# Patient Record
Sex: FEMALE | Race: WHITE
[De-identification: names, ages, dates, MRNs, and addresses within clinical notes are randomized per-mention and may not be internally consistent; named-entity substitution may affect disease eponyms.]

---

## 2018-07-30 ENCOUNTER — HOSPITAL ENCOUNTER (OUTPATIENT)
Dept: HOSPITAL 80 - BRMIMAGING | Age: 68
End: 2018-07-30
Attending: INTERNAL MEDICINE
Payer: COMMERCIAL

## 2018-07-30 DIAGNOSIS — R18.8: ICD-10-CM

## 2018-07-30 DIAGNOSIS — K74.69: Primary | ICD-10-CM

## 2018-09-06 ENCOUNTER — HOSPITAL ENCOUNTER (OUTPATIENT)
Dept: HOSPITAL 80 - BRMIMAGING | Age: 68
End: 2018-09-06
Attending: FAMILY MEDICINE
Payer: COMMERCIAL

## 2018-09-06 DIAGNOSIS — N64.89: Primary | ICD-10-CM

## 2019-01-23 ENCOUNTER — HOSPITAL ENCOUNTER (OUTPATIENT)
Dept: HOSPITAL 80 - BRMIMAGING | Age: 69
End: 2019-01-23
Attending: INTERNAL MEDICINE
Payer: COMMERCIAL

## 2019-01-23 DIAGNOSIS — R18.8: ICD-10-CM

## 2019-01-23 DIAGNOSIS — K74.69: Primary | ICD-10-CM

## 2019-01-23 DIAGNOSIS — K83.8: ICD-10-CM

## 2019-02-07 ENCOUNTER — HOSPITAL ENCOUNTER (OUTPATIENT)
Dept: HOSPITAL 80 - CIMAGING | Age: 69
End: 2019-02-07
Attending: FAMILY MEDICINE
Payer: COMMERCIAL

## 2019-02-07 DIAGNOSIS — I82.4Z2: ICD-10-CM

## 2019-02-07 DIAGNOSIS — I82.4Y2: Primary | ICD-10-CM

## 2019-02-21 ENCOUNTER — HOSPITAL ENCOUNTER (INPATIENT)
Dept: HOSPITAL 80 - CED | Age: 69
LOS: 3 days | Discharge: HOME | DRG: 253 | End: 2019-02-24
Attending: HOSPITALIST | Admitting: HOSPITALIST
Payer: COMMERCIAL

## 2019-02-21 DIAGNOSIS — E03.9: ICD-10-CM

## 2019-02-21 DIAGNOSIS — D69.6: ICD-10-CM

## 2019-02-21 DIAGNOSIS — M85.88: ICD-10-CM

## 2019-02-21 DIAGNOSIS — K75.4: ICD-10-CM

## 2019-02-21 DIAGNOSIS — N18.9: ICD-10-CM

## 2019-02-21 DIAGNOSIS — N17.9: ICD-10-CM

## 2019-02-21 DIAGNOSIS — I82.512: Primary | ICD-10-CM

## 2019-02-21 DIAGNOSIS — Z85.528: ICD-10-CM

## 2019-02-21 DIAGNOSIS — I82.422: ICD-10-CM

## 2019-02-21 LAB
INR PPP: 2.24 (ref 0.83–1.16)
PLATELET # BLD: 133 10^3/UL (ref 150–400)
PROTHROMBIN TIME: 24.8 SEC (ref 12–15)

## 2019-02-21 PROCEDURE — C1876 STENT, NON-COA/NON-COV W/DEL: HCPCS

## 2019-02-21 PROCEDURE — C1769 GUIDE WIRE: HCPCS

## 2019-02-21 PROCEDURE — C1757 CATH, THROMBECTOMY/EMBOLECT: HCPCS

## 2019-02-21 PROCEDURE — C1892 INTRO/SHEATH,FIXED,PEEL-AWAY: HCPCS

## 2019-02-21 PROCEDURE — C1725 CATH, TRANSLUMIN NON-LASER: HCPCS

## 2019-02-21 PROCEDURE — C1894 INTRO/SHEATH, NON-LASER: HCPCS

## 2019-02-21 NOTE — PDGENHP
History and Physical





- Chief Complaint


LLE pain and swelling





- History of Present Illness


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 

esophageal varices presents to ED with LLE swelling and pain.  She was 

diagnosed with LLE DVT 2 weeks ago and started on Coumadin.  She went for an 

INR check today and her INR is therapeutic at 2.6.  However, staff there were 

concerned about her swelling and reported ongoing pain and a f/u ultrasound was 

ordered.  This showed extensive clot burden, which was unchanged, but extended 

proximally with unclear end point, likely involving iliacs.  She was sent to 

the ED and from there directly admitted to the hospital for further management 

of her recurrent pain and swelling from her DVT.  She is able to ambulate.  She 

denies CP or SOB.  No fevers.  No change in her abdominal girth and no 

abdominal symptoms including N/V/D.  





History Information





- Allergies/Home Medication List


Allergies/Adverse Reactions: 








gluten [Gluten] Allergy (Severe, Verified 02/21/19 15:28)


 DIARRHEA SEVERE


No Allergies [NKDA] Allergy (Verified 02/21/19 15:28)


 





Home Medications: 








Furosemide [Lasix 40 MG (*)] 40 mg PO BIDDIUR 02/21/19 [Last Taken Unknown]


Insulin Glargine,Hum.rec.anlog [Toujeo Max Solostar] 0 unit SQ 02/21/19 [Last 

Taken Unknown]


Insulin Lispro [Humalog Santiago Kwikpen] 0 unit SQ 02/21/19 [Last Taken Unknown]


Levothyroxine [Synthroid 25 mcg (*)] 25 mcg PO DAILY06 02/21/19 [Last Taken 

Unknown]


Propranolol HCl [Inderal 10mg (*)] 10 mg PO BID 02/21/19 [Last Taken Unknown]


Sertraline HCl [Zoloft 50mg (*)] 50 mg PO DAILY 02/21/19 [Last Taken Unknown]


Spironolactone [Aldactone 50 MG (RX)] 200 mg PO DAILY 02/21/19 [Last Taken 

Unknown]


Warfarin Sodium  02/21/19 [Last Taken Unknown]





I have personally reviewed and updated: family history, medical history, social 

history, surgical history





- Past Medical History


Additional medical history: ESLD 2/2 autoimmune hepatitis - MELD 15.  Ascites.  

Esophageal varices.  RCC s/p b/l partial nephrectomy.  Hypothyroidism.  Celiac.

  Crohn's.  DVT





- Surgical History


Reports: cholecystectomy, hysterectomy, hernia repair


Additional surgical history: b/l partial nephrectomy





- Family History


Positive for: cancer





- Social History


Smoking Status: Never smoked


Alcohol Use: None


Drug Use: None


Additional social history: Lives independently, on disability due to ESLD





Review of Systems


Review of Systems: 





ROS: 10pt was reviewed & negative except for what was stated in HPI & below





Physical Exam


Physical Exam: 

















Temp Pulse Resp BP Pulse Ox


 


 36.5 C   50 L  18   121/59 H  100 


 


 02/21/19 20:00  02/21/19 20:00  02/21/19 20:00  02/21/19 20:00  02/21/19 20:00











Constitutional: no apparent distress


Eyes: PERRL


Ears, Nose, Mouth, Throat: moist mucous membranes


Cardiovascular: regular rate and rhythym


Respiratory: no respiratory distress, clear to auscultation


Gastrointestinal: normoactive bowel sounds, soft, non-tender abdomen


Skin: warm


Musculoskeletal: full muscle strength, other (LLE with edema and venous 

engorgement)


Neurologic: AAOx3


Psychiatric: interacting appropriately





Assessment & Plan


Assessment: 








LLE DVT - diagnosed 2 weeks ago, now therapeutic on Coumadin, INR 2.6  Rpt u/s 

with extensive clot burden, likely involving iliacs, with recurrent pain and 

swelling.  Note h/o recurrent swelling with prolonged seated position dating 

back >10 yrs, ?May Thurner syndrome.


   -CT abd/pelvis with venous contrast tonight for further evaluation of clot 

burden and r/o obstructive process


   -discussed case with Dr. Dinora Kennedy, IR to consult in am for consideration of 

catheter directed tPa


   -NPO at midnight


   -check cbc





ESLD with ascites 2/2 autoimmune hepatitis - MELD 15, not candidate for 

transplant program.  Compensated.


   -cont home lasix and spironolactone, awaiting med rec


   -check CMP, INR


   


H/O esophageal varices - no bleeding currently


   -monitor closely on anticoagulation





RCC s/p b/l partial nephrectomy





CKD - Baseline Cr ~1.3


   -send chem panel


   -gentle hydration after IV contrast, monitor





Hypothyroidism - cont home levothyroxine





Full code





Dispo - admit to inpt, anticipate >48 hrs hospitalization for management of 

extensive LLE DVT and probable catheter directed lysis

## 2019-02-21 NOTE — EDPHY
H & P


Stated Complaint: leg swelling


Time Seen by Provider: 02/21/19 15:02


HPI/ROS: 





69 yo F with hx of left leg dvt on warfarin, inr stable 2.6 presents with ankle 

swelling, she admits she did much more walking than ususal yesterday.


No chest pain, no shortness of breath, no fever or chills, denies ankle injury.





Review of systems


As per HPI


General no fever no chills no weakness


HEENT no eye pain no eye discharge. No eye redness, no sore throat


Respiratory no cough, no shortness of breath


Cardiac no chest pain, positive peripheral edema


GI no abdominal pain, no diarrhea, no constipation, no nausea, no vomiting


  no flank pain, no hematuria, no dysuria


Musculoskeletal positive myalgias, no joint pain


Heme  positive easy bruising, no easy bleeding


Endo no polyuria, no polydipsia


Skin no rashes, no pruritus


Neuro no syncope, no dizziness, no headaches


Psych is no suicidal ideation, no homicidal ideation





Source: Patient


Exam Limitations: No limitations





- Personal History


Current Tetanus/Diphtheria Vaccine: Unsure


Current Tetanus Diphtheria and Acellular Pertussis (TDAP): Unsure





- Medical/Surgical History


Hx Asthma: No


Hx Chronic Respiratory Disease: No


Hx Diabetes: No


Hx Cardiac Disease: No


Hx Renal Disease: Yes


Hx Cirrhosis: Yes


Hx Alcoholism: No


Hx HIV/AIDS: No


Hx Splenectomy or Spleen Trauma: No


Other PMH: autoimmune hepatitis, 15% liver fx, renal CA- 1/2 of each kidney 

removed, crohns, celiac, hernia repair, alyce, hyst





- Family History


Significant Family History: No pertinent family hx





- Social History


Smoking Status: Never smoked


Alcohol Use: None


Drug Use: None





- Physical Exam


Exam: 





69 yo F alert and oriented in nad non toxic appearance afebrile


at,nc


eomi


icteric skin


neck supple


lungs cta bilat


heart rrr


abd distended, bs present, ascities, non tender


ext left lower extremtiy with diffuse swelling non  pitting





neuro alert and oriented


Constitutional: 


 Initial Vital Signs











Temperature (C)  36.4 C   02/21/19 15:08


 


Heart Rate  49 L  02/21/19 15:08


 


Blood Pressure  120/69   02/21/19 15:08


 


O2 Sat (%)  98   02/21/19 15:08








 











O2 Delivery Mode               Room Air














Allergies/Adverse Reactions: 


 





gluten [Gluten] Allergy (Severe, Verified 02/21/19 15:28)


 DIARRHEA SEVERE


No Allergies [NKDA] Allergy (Verified 02/21/19 15:28)


 








Home Medications: 














 Medication  Instructions  Recorded


 


Sertraline HCl [Zoloft 50mg (*)] 50 mg PO DAILY 09/04/13


 


Furosemide [Lasix 40 MG (*)] 40 mg PO DAILY 10/19/16


 


Spironolactone [Aldactone 25 MG 50 mg PO DAILY 10/19/16





(*)]  


 


Propranolol HCl [Inderal 10mg (*)] 10 mg PO BID 10/20/16


 


Levothyroxine  02/21/19


 


Warfarin Sodium  02/21/19














Medical Decision Making





- Diagnostics


Imaging Results: 


 Imaging Impressions





Extremity Venous Study  02/21/19 15:41


Impression:


1.  From an ultrasound standpoint, the degree of clot burden does not appear to 

be changed compared to February 7.


2.  The exact cephalad extension of this clot burden is not fully evaluated on 

any of the ultrasound studies.  The clot involves the iliac system as it goes 

beyond the common femoral vein.


3.  This degree of clot burden is consistent with the degree of leg swelling 

and symptomatology despite of anticoagulation.


4.  If symptoms truly have only been two weeks along, consider catheter-

directed thrombectomy and TPA lysis for symptomatic relief, if clinically 

indicated.


 


Findings and recommendations discussed with Kelly Can M.D., at 4:46 

p.m., on February 21, 2019.


 


Final report concurs with initial preliminary interpretation.











ED Course/Re-evaluation: 





Pt seen evaluated for increased swelling in leg with recently diagnosed DVT on 

warfarin with therapeutic INR.





US with left lower ext DVT heavy clot burden, cannot tell how high clot goes, 

likely involvement of iliacs





Radiology recc further workup to make sure there is not anatomic reason for 

clot and to consider possibility of lysis to be done by


Interventional Radiology





I discussed the case with the hospitalist to see if we could admit for this 

workup and possible lysis





Pt admitted to [facility] surg


Stable vitals.








Differential Diagnosis: 





Differential diagnosis considered but not limited to


Deep vein thrombosis





Departure





- Departure


Disposition: [facility] Inpatient Acute


Clinical Impression: 


 DVT (deep venous thrombosis)





Condition: Good

## 2019-02-22 LAB
INR PPP: 1.87 (ref 0.83–1.16)
PLATELET # BLD: 107 10^3/UL (ref 150–400)
PLATELET # BLD: 135 10^3/UL (ref 150–400)
PROTHROMBIN TIME: 21.6 SEC (ref 12–15)

## 2019-02-22 PROCEDURE — 3E03317 INTRODUCTION OF OTHER THROMBOLYTIC INTO PERIPHERAL VEIN, PERCUTANEOUS APPROACH: ICD-10-PCS | Performed by: RADIOLOGY

## 2019-02-22 RX ADMIN — PROPRANOLOL HYDROCHLORIDE SCH MG: 20 TABLET ORAL at 21:20

## 2019-02-22 RX ADMIN — SERTRALINE HYDROCHLORIDE SCH MG: 50 TABLET, FILM COATED ORAL at 21:20

## 2019-02-22 RX ADMIN — Medication SCH MLS: at 21:57

## 2019-02-22 RX ADMIN — FUROSEMIDE SCH MG: 40 TABLET ORAL at 18:36

## 2019-02-22 RX ADMIN — FUROSEMIDE SCH MG: 40 TABLET ORAL at 08:32

## 2019-02-22 RX ADMIN — INSULIN LISPRO SCH UNITS: 100 INJECTION, SOLUTION INTRAVENOUS; SUBCUTANEOUS at 12:15

## 2019-02-22 RX ADMIN — INSULIN LISPRO SCH UNITS: 100 INJECTION, SOLUTION INTRAVENOUS; SUBCUTANEOUS at 18:36

## 2019-02-22 RX ADMIN — SPIRONOLACTONE SCH: 100 TABLET, FILM COATED ORAL at 21:38

## 2019-02-22 RX ADMIN — SERTRALINE HYDROCHLORIDE SCH MG: 50 TABLET, FILM COATED ORAL at 00:29

## 2019-02-22 RX ADMIN — LEVOTHYROXINE SODIUM SCH MCG: 25 TABLET ORAL at 08:32

## 2019-02-22 RX ADMIN — INSULIN LISPRO SCH: 100 INJECTION, SOLUTION INTRAVENOUS; SUBCUTANEOUS at 08:34

## 2019-02-22 NOTE — HOSPPROG
Hospitalist Progress Note


Assessment/Plan: 


68y female with c/o swelling. First encounter, chart reviewed. 





#LLE DVT 


   - diagnosed 2 weeks ago, now therapeutic on Coumadin, INR 2.6 


   - Rpt u/s with extensive clot burden, likely involving iliacs, with 

recurrent pain and swelling.  


   - Note h/o recurrent swelling with prolonged seated position dating back >10 

yrs, ?May Thurner syndrome.


   -CT abd/pelvis with venous contrast abnormal 


   -discussed case with Dr. Dinora Kennedy, IR, will go for intervention later today


   - NPO





#ESLD with ascites 2/2 autoimmune hepatitis 


   - MELD 15, not candidate for transplant program.  Compensated.


   -cont home lasix and spironolactone


   


#H/O esophageal varices 


   - no bleeding currently


   -monitor closely on anticoagulation





#RCC s/p b/l partial nephrectomy





#CKD 


   - Baseline Cr ~1.3


   -gentle hydration 





#Hypothyroidism 


   - cont home levothyroxine





#Bone osseous


   -on CT scan


   -change 


   -needs further evaluation, whole body scan?


   -address after clot treatment





Full code





Dispo - admit to inpt, anticipate >48 hrs hospitalization for management of 

extensive LLE DVT and probable catheter directed lysis





Subjective: Feeling well. Tired. No pain.


Objective: 


 Vital Signs











Temp Pulse Resp BP Pulse Ox


 


 36.8 C   58 L  16   96/45 L  92 


 


 02/22/19 11:54  02/22/19 11:54  02/22/19 11:54  02/22/19 11:54  02/22/19 11:54








 Laboratory Results





 02/22/19 04:40 





 02/22/19 04:50 





 











PT  24.8 SEC (12.0-15.0)  H  02/21/19  21:15    


 


INR  2.24  (0.83-1.16)  H  02/21/19  21:15    














- Physical Exam


Constitutional: appears nourished, not in pain, chronically ill appearing


Eyes: PERRL, anicteric sclera, EOMI


Ears, Nose, Mouth, Throat: moist mucous membranes, hearing normal, ears appear 

normal


Cardiovascular: edema, No JVD, No tachycardia


Respiratory: no respiratory distress, no rales or rhonchi, reduced air movement


Gastrointestinal: normoactive bowel sounds, ascites, No tenderness


Skin: warm, normal color, No mottled


Musculoskeletal: no joint effusions, pain with ROM, generalized weakness


Neurologic: AAOx3


Psychiatric: interacting appropriately, not anxious, not encephalopathic





ICD10 Worksheet


Patient Problems: 


 Problems











Problem Status Onset


 


DVT (deep venous thrombosis) Acute

## 2019-02-22 NOTE — PDMN
Medical Necessity


Medical necessity: MCG  350 DVT:  A-4 days:      68 yr  F with LLE DVT  Dg 2 

weeks ago- started on Coumadin,- therapeutic  INR 2.6,  today U/S shows 

extensive clot burden, extending proximally likely involving iliac's.  pt with 

recurrent pain and swelling from DVT.   PMHx:  ESLD 2/2 autoimmune hepatitis, -

MELD 15.  ascites, esophageal varices,  RCC s/p  bilat. partial nephrectomy, 

Hypothyroidism, Celiac, Crohns,  DVT.   anticipate > 2 MN ongoing med nec care 

for DVT - prob.  catheter directed lysis.

## 2019-02-22 NOTE — PDRADPN
Radiology Procedure Note


Date of Procedure: 02/22/19


Radiologist: Dinora Kennedy


Anesthesia: IV Sedation


Pre-op Diagnosis: LLE DVT


Post-op Diagnosis: SAME


Indication: WORSENING SWELLING


Procedure: VENOGRAM, TPA LYSIS


Finding(s): CLOT STOPS AT EIV.  NO VENOGRAPHIC SIGN OF MAY-THURNER TODAY.  WILL 

RECHECK TOMORROW.  LYSIS THROUGH CLOTTED SFV, CFV, AND EIV.


Inf/Abcess present in the surg proc area at time of surgery?: No

## 2019-02-22 NOTE — ASMTCMCOM
CM Note

 

CM Note                       

Notes:

Reviewed chart, pt admitted for DVT. She lives alone but is independent in ADLs and has a local 

dtr. She will transfer to ICU post procedure. She is on dissability for ESDL d/t autoimmune 

hepatitis. DC needs uncertain, PT/OT to eval.



DC Plan: TBD

 

Date Signed:  02/22/2019 11:35 AM

Electronically Signed By:Kyra Le RN

## 2019-02-23 LAB
INR PPP: 1.61 (ref 0.83–1.16)
INR PPP: 1.67 (ref 0.83–1.16)
PLATELET # BLD: 108 10^3/UL (ref 150–400)
PROTHROMBIN TIME: 19.3 SEC (ref 12–15)
PROTHROMBIN TIME: 19.8 SEC (ref 12–15)

## 2019-02-23 PROCEDURE — 067N3DZ DILATION OF LEFT FEMORAL VEIN WITH INTRALUMINAL DEVICE, PERCUTANEOUS APPROACH: ICD-10-PCS | Performed by: RADIOLOGY

## 2019-02-23 PROCEDURE — 067G3DZ DILATION OF LEFT EXTERNAL ILIAC VEIN WITH INTRALUMINAL DEVICE, PERCUTANEOUS APPROACH: ICD-10-PCS | Performed by: RADIOLOGY

## 2019-02-23 RX ADMIN — FUROSEMIDE SCH MG: 40 TABLET ORAL at 09:13

## 2019-02-23 RX ADMIN — SERTRALINE HYDROCHLORIDE SCH MG: 50 TABLET, FILM COATED ORAL at 20:59

## 2019-02-23 RX ADMIN — ACETAMINOPHEN PRN MG: 325 TABLET ORAL at 22:13

## 2019-02-23 RX ADMIN — INSULIN LISPRO SCH: 100 INJECTION, SOLUTION INTRAVENOUS; SUBCUTANEOUS at 07:38

## 2019-02-23 RX ADMIN — ENOXAPARIN SODIUM SCH MG: 100 INJECTION SUBCUTANEOUS at 23:06

## 2019-02-23 RX ADMIN — LEVOTHYROXINE SODIUM SCH MCG: 25 TABLET ORAL at 09:13

## 2019-02-23 RX ADMIN — SPIRONOLACTONE SCH MG: 100 TABLET, FILM COATED ORAL at 16:06

## 2019-02-23 RX ADMIN — ENOXAPARIN SODIUM SCH MG: 100 INJECTION SUBCUTANEOUS at 16:00

## 2019-02-23 RX ADMIN — Medication SCH MLS: at 03:14

## 2019-02-23 RX ADMIN — Medication SCH MLS: at 09:14

## 2019-02-23 RX ADMIN — PROPRANOLOL HYDROCHLORIDE SCH MG: 20 TABLET ORAL at 20:58

## 2019-02-23 RX ADMIN — INSULIN LISPRO SCH: 100 INJECTION, SOLUTION INTRAVENOUS; SUBCUTANEOUS at 16:44

## 2019-02-23 RX ADMIN — ACETAMINOPHEN PRN MG: 325 TABLET ORAL at 07:38

## 2019-02-23 RX ADMIN — ACETAMINOPHEN PRN MG: 325 TABLET ORAL at 15:55

## 2019-02-23 RX ADMIN — FUROSEMIDE SCH MG: 40 TABLET ORAL at 18:02

## 2019-02-23 RX ADMIN — INSULIN LISPRO SCH: 100 INJECTION, SOLUTION INTRAVENOUS; SUBCUTANEOUS at 11:47

## 2019-02-23 NOTE — HOSPPROG
Hospitalist Progress Note


Assessment/Plan: 





68y female with recently diagnosed LLE DVT c/o persistent pain and swelling. 





#LLE DVT - s/p catheter directed lysis and iliac vein stented, discussed with 

Dr. Kennedy


   - resume coumadin, pharmacy to dose


   - bridge with lovenox until INR therapeutic





#ESLD with ascites 2/2 autoimmune hepatitis - MELD 15, not candidate for 

transplant program.  Compensated.


   -cont home lasix and spironolactone


   


#H/O esophageal varices - no bleeding currently


   -monitor closely on anticoagulation





#RCC s/p b/l partial nephrectomy





#CKD - Baseline Cr ~1.3





#Hypothyroidism 


   - cont home levothyroxine





#L1 osseous lesion - seen on CT scan


   - outpt bone scan





Full code





Dispo - cont inpt, likely dc tomorrow


Subjective: Pt feels much better.  She is please with outcome of procedure.  No 

pain.  She is able to ambulate better.


Objective: 


 Vital Signs











Temp Pulse Resp BP Pulse Ox


 


 36.8 C   48 L  12   102/66   98 


 


 02/23/19 14:00  02/23/19 15:53  02/23/19 15:53  02/23/19 15:53  02/23/19 15:53








 Laboratory Results





 02/23/19 04:45 





 02/22/19 16:00 





 











 02/22/19 02/23/19 02/24/19





 05:59 05:59 05:59


 


Intake Total  1123 1000


 


Output Total  1250 950


 


Balance  -127 50








 











PT  19.8 SEC (12.0-15.0)  H  02/23/19  11:05    


 


INR  1.67  (0.83-1.16)  H  02/23/19  11:05    














- Physical Exam


Constitutional: no apparent distress


Eyes: PERRL


Ears, Nose, Mouth, Throat: moist mucous membranes


Cardiovascular: regular rate and rhythym


Respiratory: no respiratory distress, clear to auscultation


Gastrointestinal: normoactive bowel sounds, soft, non-tender abdomen


Skin: warm


Musculoskeletal: full muscle strength, other (LLE with decreased edema, still 

some venous engorgement)


Neurologic: AAOx3


Psychiatric: interacting appropriately





ICD10 Worksheet


Patient Problems: 


 Problems











Problem Status Onset


 


DVT (deep venous thrombosis) Acute

## 2019-02-23 NOTE — PDRADPN
Radiology Procedure Note


Date of Procedure: 02/23/19


Radiologist: Dinora Kennedy


Anesthesia: IV Sedation


Pre-op Diagnosis: LLE DVT


Post-op Diagnosis: SAME


Indication: TPA LYSIS FOLLOW UP


Procedure: VENOGRAM AND ANGIOPLASTY


Finding(s): OCCLUSION OF DISTAL EIV OF UNKNOWN CAUSE WITH SHARP TRANSITION, 

STENTED.  CHRONIC CLOT IN CFV AND ONE OF THE SFV.  MUCH MORE RAPID ANTEGRADE 

DRAINAGE VIA COLLATERALS AND PATENT PAIRED SFV POST STENT.


Inf/Abcess present in the surg proc area at time of surgery?: No

## 2019-02-24 VITALS — SYSTOLIC BLOOD PRESSURE: 106 MMHG | DIASTOLIC BLOOD PRESSURE: 56 MMHG

## 2019-02-24 LAB
INR PPP: 1.87 (ref 0.83–1.16)
PLATELET # BLD: 97 10^3/UL (ref 150–400)
PROTHROMBIN TIME: 21.6 SEC (ref 12–15)

## 2019-02-24 RX ADMIN — INSULIN LISPRO SCH: 100 INJECTION, SOLUTION INTRAVENOUS; SUBCUTANEOUS at 08:35

## 2019-02-24 RX ADMIN — FUROSEMIDE SCH MG: 40 TABLET ORAL at 08:34

## 2019-02-24 RX ADMIN — ENOXAPARIN SODIUM SCH MG: 100 INJECTION SUBCUTANEOUS at 08:35

## 2019-02-24 RX ADMIN — LEVOTHYROXINE SODIUM SCH MCG: 25 TABLET ORAL at 08:33

## 2019-02-24 NOTE — ASMTDCNOTE
Case Management Discharge

 

Discharge Order Complete?     Answers:  Yes                                   

Patient to Obtain             Answers:  via Family                            

Medications                                                                   

Transportation Arranged       Answers:  Family/Friends                        

Discharge Comments            

Notes:

Pt is being discharged independently, family to transport. No CM needs identified. 

 

Date Signed:  02/24/2019 09:23 AM

Electronically Signed By:KATHY Tim

## 2019-02-24 NOTE — GDS
[f rep st]



                                                             DISCHARGE SUMMARY





DISCHARGE DIAGNOSES:  

1.  Left lower extremity deep vein thrombosis, status post catheter-directed lysis.

2.  End-stage liver disease with ascites secondary to autoimmune hepatitis, compensated.

3.  History of esophageal varices without active bleeding.

4.  History of renal cell carcinoma status post bilateral partial nephrectomy.

5.  Chronic kidney disease with a baseline creatinine around 1.3.

6.  Hypothyroidism.

7.  Lumbar spine osseous lesion, recommend outpatient bone scan.

8.  Thrombocytopenia, HIT antibody pending.



CONSULTANTS:  Dr. Dinora Kennedy, Interventional Radiology.



HISTORY:  For details, please see history and physical dated 2019.  In brief, this patie
nt is a 68-year-old female with a history of end-stage liver disease secondary to autoimmune hepatiti
s who presented to the emergency department with left lower extremity pain and swelling 2 weeks after
 being diagnosed with a DVT.  An ultrasound in Urgent Care showed an extensive clot burden in the pro
ximal venous system.  She was admitted to the hospital for further management.



HOSPITAL COURSE:  Patient was admitted to the Medical/Surgical unit.  Repeat ultrasound showed persis
tent unchanged clot burden, though it was unclear how proximal her thrombosis extended.  CT abdomen w
ith venous contrast was performed and revealed thrombosis into the iliac vein system.  IR consult was
 obtained.  She underwent catheter-directed lysis and stenting of the left external iliac vein.  On f
ollowup venogram, it was found she had more chronic clot in the common femoral vein.  This was not re
sponsive to tPA lysis and it persists.  However, the occlusion of her distal external iliac vein is r
esolved and she has much more rapid antegrade drainage post stenting.  Her symptoms are also signific
antly improved and she is ambulating better with decreased swelling and less pain.  Her Coumadin was 
held gail-procedurally and her INR dropped to 1.61.  She was bridged with Lovenox.  Her INR on disch was 1.87, thus, she will not be bridged at discharge, but I recommend she have her INR rechecked 
on Tuesday.  In addition, it is noted her platelets dropped from 133,000 to 97,000 over a 3-day perio
d.  She did have both heparin and Lovenox exposure during this hospitalization.  A HIT antibody is pe
nding.  I recommend her PCP recheck her CBC in the next 1-2 days.  I also note she has a low hemoglob
in and a history of esophageal varices.  She has had no evidence of active bleeding.  In addition, an
 incidental finding on her CT revealed an L1 osseous lesion of undetermined significance.  I recommen
d she undergo outpatient bone scan at the direction of her primary care.



DISPOSITION:  Patient is discharged home in stable condition.



DISCHARGE MEDICATIONS:  Please see [facility] for completed outpatient medication list.  There are no n
ew medications on discharge.  She will continue her previous home medications includin.  Warfarin with a plan to take 0.5 mg today and 1 mg tomorrow.  She will have her INR checked on Tu
esday.  Further dosing pending that result.

2.  Insulin glargine 26 units q.h.s.

3.  Insulin lispro sliding scale.

4.  Synthroid 25 mcg p.o. daily.

5.  Zoloft 50 mg p.o. q.h.s..

6.  Lasix 40 mg p.o. b.i.d.

7.  Spironolactone 200 mg p.o. daily.

8.  Propranolol 20 mg p.o. q.h.s.



FOLLOWUP:  

1.  Dr. Kiki Messer, primary care, in 1-2 days to recheck CBC and follow up on the pending HIT anti
body.

2.  Follow up in [facility] on , for a repeat INR.





Job #:  [id number]

## 2019-02-24 NOTE — ASMTLACE
LACE

 

Length of stay for            Answers:  2 days                                

current admission                                                             

Acuity / Level of             Answers:  Yes                                   

Care: Did the patient                                                         

have an inpatient                                                             

admission?                                                                    

Comorbidities - select        Answers:  Moderate or severe liver              

all that apply                          or renal disease                      

# of Emergency department     Answers:  1-2                                   

visits in the last 6                                                          

months                                                                        

Score: 10

 

Date Signed:  02/24/2019 09:23 AM

Electronically Signed By:KATHY Tim

## 2019-03-05 ENCOUNTER — HOSPITAL ENCOUNTER (INPATIENT)
Dept: HOSPITAL 80 - CED | Age: 69
LOS: 2 days | Discharge: HOME | DRG: 300 | End: 2019-03-07
Attending: INTERNAL MEDICINE | Admitting: INTERNAL MEDICINE
Payer: COMMERCIAL

## 2019-03-05 DIAGNOSIS — E87.6: ICD-10-CM

## 2019-03-05 DIAGNOSIS — K57.32: ICD-10-CM

## 2019-03-05 DIAGNOSIS — E11.22: ICD-10-CM

## 2019-03-05 DIAGNOSIS — Z79.4: ICD-10-CM

## 2019-03-05 DIAGNOSIS — Z85.528: ICD-10-CM

## 2019-03-05 DIAGNOSIS — K75.4: ICD-10-CM

## 2019-03-05 DIAGNOSIS — N18.3: ICD-10-CM

## 2019-03-05 DIAGNOSIS — Z79.01: ICD-10-CM

## 2019-03-05 DIAGNOSIS — E03.9: ICD-10-CM

## 2019-03-05 DIAGNOSIS — I82.422: Primary | ICD-10-CM

## 2019-03-05 RX ADMIN — ACETAMINOPHEN PRN MG: 325 TABLET ORAL at 23:29

## 2019-03-05 NOTE — EDPHY
H & P


Time Seen by Provider: 03/05/19 15:41


HPI/ROS: 





Chief complaint.  Leg swelling





HPI.  Patient is a 68-year-old female with DVT that was initially diagnosed 

February 7th.  She was placed on Coumadin.  Because of continued swelling she 

had a repeat ultrasound on February 21st.  The ultrasound showed that the clot 

extended above the common femoral vein.  Subsequent CT abdomen and pelvis 

showed possible extension into the IVC.  Patient had a catheter directed 

thrombectomy and tPA on 02/22.  This was followed the next day with angioplasty 

of the left external iliac vein and stent placement in the external iliac vein.

  She was discharged home on February 24th.  She continues on her Coumadin.  

She went to have an INR checked today and complained of increased pain and 

swelling for the last 2 days especially with walking and trying to put on her 

compression socks.  Her INR was 2.5 today.  Patient has no chest discomfort or 

shortness of breath.  She was referred for further evaluation of pain swelling 

of the left lower extremity





ROS


10 systems were reviewed and negative with the exception of the elements 

mentioned in the history of present illness





Past Medical/Surgical History: 





Autoimmune hepatitis, 15% liver function, renal cancer with half of each kidney 

removed, Crohn's disease, celiac disease, hernia repair, cholecystectomy, 

hysterectomy, left lower extremity DVT, benign tumor left breast


Social History: 





Single, nonsmoker, no alcohol


Smoking Status: Never smoked


Physical Exam: 





General Appearance:  Alert pleasant well-developed female mild distress vital 

signs are stable


Eyes: Pupils equal and round no pallor or injection.


ENT, Mouth:  Mucous membranes are moist.


Respiratory:  There are no retractions, lungs are clear to auscultation.


Cardiovascular: Regular rate and rhythm.


Gastrointestinal:   Abdomen is soft and nontender, no masses, bowel sounds 

normal.


Neurological:  Awake and alert, sensory and motor exams grossly normal.


Skin: Warm and dry, no rashes.


Musculoskeletal:  Neck is supple nontender.


Extremities left lower extremity is swollen from upper thigh to foot.  No 

erythema or redness.  Dorsalis pedis and posterior tibial pulses are full equal 

and symmetrical with right foot.


Psychiatric: Patient is oriented X 3, there is no agitation.


Constitutional: 


 Initial Vital Signs











Temperature (C)  36.8 C   03/05/19 15:18


 


Heart Rate  56 L  03/05/19 15:18


 


Respiratory Rate  16   03/05/19 15:18


 


Blood Pressure  114/69   03/05/19 15:18


 


O2 Sat (%)  96   03/05/19 15:18








 











O2 Delivery Mode               Room Air














Allergies/Adverse Reactions: 


 





gluten [Gluten] Allergy (Verified 03/05/19 15:25)


 Pt reports DIARRHEA SEVERE








Home Medications: 














 Medication  Instructions  Recorded


 


Furosemide [Lasix 40 MG (*)]  02/21/19


 


Insulin Glargine,Hum.rec.anlog  02/21/19





[Toujeo Max Solostar]  


 


Insulin Lispro [Humalog Santiago  02/21/19





Kwikpen]  


 


Levothyroxine [Synthroid 25 mcg  02/21/19





(*)]  


 


Propranolol HCl [Inderal 10mg (*)]  02/21/19


 


Sertraline HCl [Zoloft 50mg (*)]  02/21/19


 


Spironolactone [Aldactone 50 MG  02/21/19





(RX)]  


 


Warfarin Sodium [Coumadin 1MG (*)]  02/21/19


 


Benadryl  03/05/19














Medical Decision Making





- Diagnostics


Imaging Results: 


 Imaging Impressions





Extremity Venous Study  03/05/19 16:01


Impression: Early improvement x 2 weeks. The DVT demonstrates early 

recanalization. If it is clinically important to evaluate the patency of the 

iliac vein stents, then recommend CT with IV contrast (with a delay to 

visualize slow venous filling).


 


Results discussed with Dr. Nghia Pulido at 5:44 PM.


 








Abdomen CT  03/05/19 17:55


Impression:


1.  Left common iliac and external iliac vein stents do not appear collapsed.  

However, due to suboptimal enhancement, thrombus cannot be excluded in this 

region.  There is thrombus in the left common femoral vein, with left inguinal 

edema, consistent with left lower extremity deep venous thrombosis.  Consider 

Interventional Radiology consult follow up.


2.  Cirrhosis, splenomegaly, small amount of diffuse ascites, and diffuse 

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


3.  Prior cholecystectomy.


4.  Bilateral renal cortical scarring, without hydronephrosis.


5.  Atherosclerotic aorta, without aneurysm.


6.  Sigmoid diverticulosis, without diverticulitis.  Circumferential wall 

thickening of the descending colon, and, therefore, nonspecific colitis cannot 

be excluded.  Consider GI consult for follow-up colonoscopy when the patient's 

medical condition permits. 


 


Findings and recommendations discussed with Emergency Department physician, 

Nghia Pulido M.D., on 1900 hours, on March 5, 2019.


 


Final report concurs with initial preliminary interpretation.








Ultrasound left lower extremity reviewed by me and discussed with Dr. Oppenheimer shows early recannulization.  Clot is still present from groin to 

knee.  Unable to see the stents and assess integrity.  CT with IV contrast with 

delay is recommended





Abdominal CT reviewed by me and discussed with Radiology really does not show 

whether patient's stents are patent or not.  There is clot present.


Procedures: 





IV normal saline


ED Course/Re-evaluation: 





Re-evaluation 5:50 p.m..  Discussed the ultrasound findings with the patient 

and her daughter.  We discussed recommendation for CT to assess integrity of 

the stents in the left external iliac vein.  They expressed understanding and 

agreement





Patient remained stable.  Patient and daughter and I discussed treatment plan 

including recommendation for admission and further evaluation by interventional 

radiology.  They expressed understanding and agreement





Consulted discussed case Dr. Hill, hospitalist, who agrees to the admission


Differential Diagnosis: 





Patient has extensive DVT.  She has a therapeutic INR with it being 2.5 today.  

However she has had stents placed in we cannot determine the patency of these 

stents.  I am concerned that she has had thrombosis of the stents that are in 

the left external iliac vein.  With tried ultrasound and CT to evaluate the 

patency but really she needs interventional radiology for direct evaluation.  I 

am concerned that she has thrombosis of the stents as for the last 2 days she 

has had increased pain and swelling of the left lower extremity despite being 

therapeutic on her INR





- Data Points


Laboratory Results: 


 











  03/05/19





  18:09


 


POC Sodium  138 mEq/L mEq/L





   (135-145) 


 


POC Potassium  3.2 mEq/L L mEq/L





   (3.3-5.0) 


 


POC Chloride  107.0 mEq/L mEq/L





   () 


 


POC Total CO2  19 mEq/L L mEq/L





   (22-31) 


 


POC BUN  17 mg/dL mg/dL





   (7-23) 


 


POC Creatinine  1.1 mg/dL H mg/dL





   (0.6-1.0) 


 


POC Glucose  140 mg/dL H mg/dL





   () 


 


POC Calcium  8.5 mg/dL mg/dL





   (8.5-10.4) 











Medications Given: 


 








Discontinued Medications





Sodium Chloride (Ns)  1,000 mls @ 0 mls/hr IV EDNOW ONE; Wide Open


   PRN Reason: Protocol


   Stop: 03/05/19 17:55


   Last Admin: 03/05/19 18:02 Dose:  1,000 mls





Point of Care Test Results: 


 Chemistry











  03/05/19





  18:09


 


POC Sodium  138 mEq/L mEq/L





   (135-145) 


 


POC Potassium  3.2 mEq/L L mEq/L





   (3.3-5.0) 


 


POC Chloride  107.0 mEq/L mEq/L





   () 


 


POC Total CO2  19 mEq/L L mEq/L





   (22-31) 


 


POC BUN  17 mg/dL mg/dL





   (7-23) 


 


POC Creatinine  1.1 mg/dL H mg/dL





   (0.6-1.0) 


 


POC Glucose  140 mg/dL H mg/dL





   () 


 


POC Calcium  8.5 mg/dL mg/dL





   (8.5-10.4) 














Departure





- Departure


Disposition: [facility] Inpatient Acute


Clinical Impression: 


DVT (deep venous thrombosis)


Qualifiers:


 DVT location: lower extremity Affected thrombotic vein of extremity: iliac 

Chronicity: acute Laterality: left Qualified Code(s): I82.422 - Acute embolism 

and thrombosis of left iliac vein





Condition: Fair

## 2019-03-06 LAB
INR PPP: 2.68 (ref 0.83–1.16)
PLATELET # BLD: 134 10^3/UL (ref 150–400)
PROTHROMBIN TIME: 27.2 SEC (ref 12–15)

## 2019-03-06 RX ADMIN — FUROSEMIDE SCH MG: 40 TABLET ORAL at 20:40

## 2019-03-06 RX ADMIN — INSULIN LISPRO SCH: 100 INJECTION, SOLUTION INTRAVENOUS; SUBCUTANEOUS at 11:49

## 2019-03-06 RX ADMIN — ACETAMINOPHEN PRN MG: 325 TABLET ORAL at 02:21

## 2019-03-06 RX ADMIN — INSULIN LISPRO SCH UNITS: 100 INJECTION, SOLUTION INTRAVENOUS; SUBCUTANEOUS at 17:55

## 2019-03-06 RX ADMIN — ONDANSETRON PRN MG: 4 TABLET, ORALLY DISINTEGRATING ORAL at 05:37

## 2019-03-06 RX ADMIN — ACETAMINOPHEN PRN MG: 325 TABLET ORAL at 11:29

## 2019-03-06 RX ADMIN — ACETAMINOPHEN PRN MG: 325 TABLET ORAL at 23:25

## 2019-03-06 NOTE — HOSPPROG
Hospitalist Progress Note


Assessment/Plan: 





                                           


Khushboo is a 67 y/o female w a DVT s/p iliac stent on 2/22 who presented with 

increasing lower extremity edema and pain.





*extensive DVT


-INR is therapeutic


-symptoms have waxed and waned; will discuss w IR (Dr Miner will look at the 

CT scan to further evaluate





*Left lower ext swelling and pain


-elevation and pain meds


-pain is better today





*hx of ESLD


-due to autoimmune hepatitis w assoc esophageal varices





*anemia


-bit lower than her baseline





*Celiac disease


-gluten free diet





*sigmoid diverticulosis noted on imaging


-high fiber diet


-further f/u w GI





*diarrhea


-small episode on admission





*CKD


-creat is 1.2





*DM2


-sliding scale


-resume home meds





*hypokalemia


-add protocol





*plan: Dr Marisol Stout to give further input.














Subjective: Khushboo is feeling better now, pain has lifted.  Describes her leg 

that it feels like a tree trunk when she ambulates.


Objective: 


 Vital Signs











Temp Pulse Resp BP Pulse Ox


 


 36.7 C   64   16   114/61   97 


 


 03/06/19 08:08  03/06/19 08:08  03/06/19 08:08  03/06/19 08:08  03/06/19 08:08








 Laboratory Results





 03/06/19 04:16 





 03/06/19 04:16 





 











 03/05/19 03/06/19 03/07/19





 05:59 05:59 05:59


 


Intake Total  1000 


 


Output Total  150 150


 


Balance  850 -150








 











PT  27.2 SEC (12.0-15.0)  H  03/06/19  04:16    


 


INR  2.68  (0.83-1.16)  H  03/06/19  04:16    














- Physical Exam


Constitutional: no apparent distress, No not in pain (tenderness around left 

ankle area)


Ears, Nose, Mouth, Throat: hearing normal


Cardiovascular: regular rate and rhythym, other (palpable dp pulse on left foot)


Respiratory: no respiratory distress


Gastrointestinal: normoactive bowel sounds, ascites


Skin: warm


Neurologic: AAOx3, sensation intact bilaterally


Psychiatric: interacting appropriately





ICD10 Worksheet


Patient Problems: 


 Problems











Problem Status Onset


 


DVT (deep venous thrombosis) Acute

## 2019-03-06 NOTE — GHP
[f rep st]



                                                            HISTORY AND PHYSICAL





DATE OF ADMISSION:  03/05/2019



Patient provides history, appears reliable.  EMR is reviewed.  Case discussed with ED provider.



CHIEF COMPLAINT:  Left lower extremity pain and swelling.



HISTORY OF PRESENT ILLNESS:  This is a very pleasant 68-year-old female with past medical history sig
nificant for end-stage liver disease, ascites due to autoimmune hepatitis, esophageal varices, hypoth
yroidism, renal cell carcinoma, status post partial bilateral nephrectomies, celiac disease, Crohn di
sease, CKD stage 3 with a baseline creatinine of 1.3, sigmoid diverticulosis, diabetes type 2, and mo
re recently a left lower extremity DVT extending from the IVC down to the left femoral and external i
nternal iliacs into the lower extremity, who presents to the emergency department complaining of incr
easing distal lower extremity pain and swelling.  The patient's course is complicated in that she was
 diagnosed on 02/07/19.  She was evaluated, started on Coumadin and return to the emergency departmen
t with complaints of increasing groin and lower extremity pain and swelling.  The patient underwent i
maging that showed extensive clot burden on 02/22.  The patient underwent angioplasty with placement 
of external iliac stenting x2 with overlap stent.  She was subsequently discharged on 02/24/2019.  Cricket valdes reports that she has been applying compression socks of variable pressures.  She denies any chest p
ain, shortness of breath, palpitations, or right-sided lower extremity swelling.  She does note that 
the swelling in her feet are more notable in the evening after she has been on her feet all day.  Miracle
sue does take them off at the end of the day, but recently has noticed that her swelling persists de
spite the use of compression socks.  The patient denies any groin pain, but she does note persistent 
left lower extremity swelling.  It is not as severe as it was initially.  Additionally, the patient d
enies any numbness or tingling in her distal extremity.  She denies any cool extremity.  The patient 
notes that she has significant pain at the exposed site of the compression soft right along the media
l aspect of her ankle. 



In the emergency department at [facility], the patient underwent ultrasound imaging of 
her left lower extremity which did show persistent but improvement in her DVT, chronic early recannul
ating thrombus of the left leg between the common femoral vein and popliteal vein with noted tiny shwetha
nnels in the thrombus present.  Femoral vein duplex with 1 vein patent and the other thrombosed.  Per
urbina and posterior tibial veins are widely patent.  Chronic occlusive DVT in the proximal greater sa
phenous vein.  Recommendation for CT imaging with contrast to further evaluate patency of iliac vein 
stents.  CT abdomen and pelvis with contrast shows thrombus in the common femoral vein, left inguinal
 edema, consistent with left lower extremity DVT.  Cirrhosis, splenomegaly, ascites, and varices, sig
moid diverticulosis, circumferential wall thickening of the descending colon.  Given worsening swelli
ng and complaints of pain, patient is admitted to the hospital for further evaluation and recommendat
ions per IR.  The patient notes that her Coumadin levels have been therapeutic.  Today it is 2.25.



REVIEW OF SYSTEMS:  GI:  Patient reports that she has had chronic diarrhea, mostly worse at night.  D
enies any melena or hematochezia.  Has been ongoing.  She has a history of Crohn's and celiac disease
.  Denies any fevers or chills.  Remainder of 10 systems reviewed, negative except as noted above.



ALLERGIES:  Gluten.  Otherwise, no known drug allergies.



HOME MEDICATIONS:  Med rec still pending for review by pharmacy, but briefly available:  Coumadin, sp
ironolactone, Zoloft, propranolol, levothyroxine, Humalog, Toujeo, Lasix, and Benadryl.



PAST MEDICAL HISTORY:  Significant for end-stage liver disease and ascites due to autoimmune hepatiti
s with esophageal varices, renal cell carcinoma, status post partial bilateral nephrectomies, hypothy
roidism, celiac disease, Crohn disease, CKD stage 3 with a baseline creatinine 1.3, sigmoid diverticu
losis, left lower extremity DVT diagnosed on 02/07/2019, as per HPI, diabetes type 2, insulin depende
nt, controlled, and bilateral breast tumor on the __________.



PAST SURGICAL HISTORY:  Significant for cholecystectomy, hernia repair, tonsillectomy, adenoidectomy,
 lumpectomy, septoplasty.



FAMILY HISTORY:  Significant for cancer.



SOCIAL HISTORY:  Patient is disabled due to her end-stage liver disease.  She denies any tobacco, lavelle
gs, or alcohol.  She lives alone but has good support in town.



CODE STATUS:  Full.



PHYSICAL EXAMINATION:  VITAL SIGNS:  Upon arrival to the emergency department at [facility], blood pressure is 114/69, heart rate is 56, respiratory rate 16, O2 saturation 96% on room air,
 temperature 36.8. Current blood pressure 105/59, heart rate 59, respiratory rate 18, O2 saturation 9
8% on room air, temperature 36.6.  GENERAL:  No acute distress.  Very pleasant adult lady is lying qu
ietly in bed.  She is pleasant and cooperative in good spirits.  HEAD:  Normocephalic, atraumatic.  E
YES:  Extraocular muscles are intact.  Pupils equal, round, symmetric bilaterally.  No scleral icteru
s or conjunctival injection.  ENT:  Mucous membranes appear moist.  No oropharyngeal erythema or exud
ates.  No nasal discharge.  NECK:  Supple.  Trachea midline.  CV:  Regular rate and rhythm.  Slightly
 bradycardic.  No murmurs, rubs, or gallops appreciated.  RESPIRATORY:  Lungs clear to auscultation b
ilaterally.  No wheezes, rales, or rhonchi appreciated.  ABDOMEN:  Distended, obese, but soft.  Posit
ml bowel sounds.  Nontender to palpation.  No rebound, guarding, or masses appreciated.  :  No sup
rapubic tenderness to palpation.  No Mckeon catheter in place.  MUSCULOSKELETAL:  Patient without any 
generalized weakness.  She moves all extremities.  Strength grossly intact.  NEUROLOGIC:  Grossly non
focal.  No facial drooping.  Moves all extremities.  PSYCH:  Patient's thought process, content, and 
questions appropriate.  Patient is pleasant and cooperative.



LABORATORY STUDIES:  Available per Point of Care from [facility], sodium is 138, potass
ium 3.2, chloride is 107, CO2 is 19, BUN is 17, creatinine is 1.1, glucose 140, calcium is 8.5.  INR 
2.58. 



Ultrasound venous duplex left lower extremity:  Difficult visualizing left iliac vein due to pelvic g
as.  Chronic early recannulated thrombus of the left leg between the common femoral vein and poplitea
l vein.  Tiny channels within the thrombus.  Femoral vein duplex:  1 vein patent and the other remain
s thrombosed.  Peroneal and posterior tibial veins are widely patent.  Chronic occlusive DVT in the p
roximal greater saphenous vein.  Impression overall:  Early improvement over 2 weeks.  DVT demonstrat
es early recannulization.  Recommend CT with IV contrast if appropriate. 



CT abdomen and pelvis with contrast:  Image report reviewed.  Left common iliac and external iliac ve
in stents do not appear collapsed.  However, due to suboptimal enhancement, thrombus cannot be exclud
ed in this region.  There is thrombus in the left common femoral vein with left inguinal edema consis
tent with left lower extremity DVT.  Consider IR consult.  Cirrhosis, splenomegaly, small amount of d
iffuse ascites and diffuse varices and probable chronic sclerosing cholangitis.  Prior cholecystectom
y, bilateral renal cortical scarring without hydronephrosis, atherosclerotic aorta.  No aneurysm.  Si
gmoid diverticulosis without diverticulitis.  Circumferential wall thickening of the descending colon
 and nonspecific colitis cannot be excluded.  Consider GI consult.



ASSESSMENT AND PLAN:  Pleasant 68-year-old female with a history of recently diagnosis deep venous th
rombosis, extensive deep venous thrombosis in the left lower extremity, status post a left external i
liac stent on 02/22/2019, who presents to the emergency department with complaints of increasing lowe
r extremity edema and pain noted.

1.  Recannulizing left lower extremity deep venous thrombosis.  The patient is therapeutic on her Cou
madin.  We will hold her morning dose pending further discussion with IR and request review and recom
mendations.  Patient reports her pain currently is manageable.  She does note that she has persistent
 waxing and waning lower extremity swelling in her left lower extremity and history of stent.  Will a
wait IR recommendations in the morning.  Patient will be made n.p.o.  Will monitor patient's coags an
d a.m. CBC.

2.  Lower extremity swelling and pain.  The patient currently reporting that her pain is controlled. 
 P.r.n.'s are available.  Patient doing well with Tylenol at this time.

3.  History of end-stage liver disease with associated sequelae due to autoimmune hepatitis with asso
ciated esophageal varices.  The patient's condition currently stable.  No evidence of decompensation.
  Continue to monitor.

4.  Diarrhea.  Patient reports this has been ongoing for the past 3 months.  She does have a history 
of celiac disease and Crohn's.  No bleeding, no fevers, no chills.  Patient has not had recent follow
up with Dr. Lora, GI.  We will continue to monitor.  Patient reports she just had a small episode o
f loose stool this evening.

5.  Chronic kidney disease, stage 3.  The creatinine today is lower than baseline.  Continue to monit
or.  Some gentle IV fluid hydration in setting of contrast load earlier in the day.  

6.  Celiac disease.  Gluten free diet once diet is advanced.

7.  Diabetes type 2.  Resume patient's home insulin regimen.  ADA diet when diet is advanced.

8.  Sigmoid diverticulosis on imaging without diverticulitis.  High-fiber diet.

9.  Hypokalemia.  Will monitor and replace.

10.  History of anemia.  Repeat CBC.

11.  Fluid, electrolyte, nutrition.  Gentle IV fluid hydration overnight.  Monitor fluid status close
ly in setting of end-stage liver disease.  Replace electrolytes.  

12.  PPX, on Coumadin therapeutic.  Monitor PT/INR.

13.  Code status:  Full.

14.  Disposition:  Patient admitted to inpatient status on the [facility] floor.  Anticipate greater mimi
n 2 midnight stay given the recannulization, increased swelling, and pain of lower extremity.  Additi
onal discussion with Interventional Radiology for recommendations in the morning.





Job #:  [id number]

## 2019-03-06 NOTE — ASMTCMCOM
CM Note

 

CM Note                       

Notes:

Pts case discussed w/ Sharla Lopes NP. Pt is a 69 y/o female admitted for left lower extremity 

pain and swelling. Pt has a complicated medical hx (see H&P). No therapies ordered at this time. Pt 


will most likely d/c independent when medically stable. CM available for changes.







Plan: Independent 

 

Date Signed:  03/06/2019 09:15 AM

Electronically Signed By:KATHY Rich

## 2019-03-06 NOTE — PDMN
Medical Necessity


Medical necessity: MCG:   M 350 DVt :  68yoF with sig med, hx returns with  LLE 

swelling and pain, pt underwent angio with placement of external iliac stenting 

X2 with overlap stent   2/24/19:    U/S shows   recannulizing LLE DVT-pt. is   

therapeutic on her Coumadin.   IR consult pend.  PMHx:  ESRD and ascites due to 

autoimmune hepatitis with esophageal varices,  renal cell carcinoma, s/p 

partial bilat. nephrectomies,  hypothyroidism, crohn disease, sigmoid 

diverticulosis, celiac disease, CKD stage 3, DM2, Hypokalemia, anemia,.  

anticipate > 2 MN ongoing med nec care, further monitoring, eval and tx.

## 2019-03-07 VITALS — SYSTOLIC BLOOD PRESSURE: 108 MMHG | DIASTOLIC BLOOD PRESSURE: 56 MMHG

## 2019-03-07 LAB
INR PPP: 2.58 (ref 0.83–1.16)
PROTHROMBIN TIME: 26.4 SEC (ref 12–15)

## 2019-03-07 RX ADMIN — ONDANSETRON PRN MG: 4 TABLET, ORALLY DISINTEGRATING ORAL at 02:14

## 2019-03-07 RX ADMIN — INSULIN LISPRO SCH: 100 INJECTION, SOLUTION INTRAVENOUS; SUBCUTANEOUS at 07:38

## 2019-03-07 RX ADMIN — FLUTICASONE PROPIONATE SCH SPRAY: 50 SPRAY, METERED NASAL at 01:21

## 2019-03-07 RX ADMIN — FUROSEMIDE SCH MG: 40 TABLET ORAL at 08:39

## 2019-03-07 RX ADMIN — FLUTICASONE PROPIONATE SCH SPRAY: 50 SPRAY, METERED NASAL at 07:51

## 2019-03-07 NOTE — GDS
[f 
rep st]



                                                             DISCHARGE SUMMARY





DISCHARGE DIAGNOSES:  

1.  Extensive deep venous thrombosis.

2.  Left lower extremity swelling and pain.

3.  History of end-stage liver disease, autoimmune hepatitis.

4.  Anemia.

5.  Celiac disease.

6.  Sigmoid diverticulosis noted on imaging.

7.  Diarrhea.

8.  Chronic kidney disease.

9.  Diabetes, type 2.

10.  Hypokalemia.



CONSULTATION:  Dr. Marisol Miner.



HISTORY OF PRESENT ILLNESS:  Briefly, the patient is a 68-year-old woman who 
was recently diagnosed with an extensive DVT in the left lower extremity, 
status post left iliac stent on 02/22/219.  She presented to the ER with 
complaints of lower extremity edema and pain.  She had a CT of the abdomen and 
pelvis with contrast that showed that the left common iliac and external iliac 
vein stents do not appear collapsed; however, due to suboptimal enhancement, 
thrombus cannot be excluded in this region.  There is thrombus in left common 
femoral vein and left inguinal edema consistent with left lower extremity DVT. 



She was evaluated by interventional radiologist, Dr. Miner.  In reviewing with 
him, he noted that she has chronic thrombus and her leg will likely remain at 
least mildly swollen.  She actually has done quite well with elevation of her 
leg.  The plan is for her to be discharged home and continue elevation and 
follow up with her PCP.



HOSPITAL COURSE:  

1.  Extensive deep vein thrombosis.  She is on Coumadin.  INR is therapeutic.  
She has another INR scheduled for next week.

2.  Left lower extremity swelling and pain.  This is markedly better with 
elevation.

3.  History of end-stage liver disease.  This is due to auto immune hepatitis.  
She also has associated varices because of this.

4.  Anemia.  She is a bit lower than her baseline.  She has an appointment with 
her primary care provider in 1 week and will have her labs rechecked.  In 
addition, she has a colonoscopy scheduled with Dr. Lora on April 1st.

5.  Celiac disease.  Continue gluten free diet.

6.  Sigmoid diverticulosis.  This was noted on imaging.  She artery has appoint 
with GI.

7.  Diarrhea.  None further.

8.  Chronic kidney disease.  Her creatinine is 1.2.

9.  Diabetes, type 2.  Resumed her home medication.

10.  Hypokalemia, resolved.



DISCHARGE CONDITION:  Stable.  Blood pressure is 108/56, heart rate 59, 
respiratory rate 16, O2 sats on room air are 97%, temperature is 37 degrees 
Celsius.



MEDICATIONS AT DISCHARGE:  Please see the EMR.



DISCHARGE INSTRUCTIONS:  

1.  To get a hemoglobin, hematocrit, and INR checked next Friday with her PCP.

2.  To follow up with Dr. Lora as scheduled.

3.  If she develops lightheadedness, fevers, chills, shortness of breath, 
worsening pain and swelling in her leg, return to the ER.



Copy requested to:

Dr. Messer



Job #:  [id number]

[facility]

## 2019-03-07 NOTE — HOSPPROG
Hospitalist Progress Note


Assessment/Plan: 





                                           


Khushboo is a 69 y/o female w a DVT s/p iliac stent on 2/22 who presented with 

increasing lower extremity edema and pain.





*extensive DVT


-INR is therapeutic





*Left lower ext swelling and pain


-elevation and pain meds


-pain is better today





*hx of ESLD


-due to autoimmune hepatitis w assoc esophageal varices





*anemia


-bit lower than her baseline


-has an appt w her PCP in a week, will get labs rechecked


-has a colonoscopy scheduled w Dr Lora on April 1





*Celiac disease


-gluten free diet





*sigmoid diverticulosis noted on imaging


-high fiber diet


-further f/u w GI





*diarrhea


-small episode on admission





*CKD


-creat is 1.2





*DM2


-sliding scale


-resume home meds





*hypokalemia


-add protocol





*plan: dc home w close f/u














Subjective: Khushboo is feeling much better today, some tenderness around her left 

ankle area.


Objective: 


 Vital Signs











Temp Pulse Resp BP Pulse Ox


 


 37.0 C   59 L  16   108/56 L  97 


 


 03/07/19 07:21  03/07/19 07:21  03/07/19 07:21  03/07/19 07:21  03/07/19 07:21








 Laboratory Results





 03/07/19 04:17 





 03/07/19 04:17 





 











 03/06/19 03/07/19 03/08/19





 05:59 05:59 05:59


 


Intake Total 1000 400 


 


Output Total 150 350 


 


Balance 850 50 








 











PT  26.4 SEC (12.0-15.0)  H  03/07/19  04:17    


 


INR  2.58  (0.83-1.16)  H  03/07/19  04:17    














- Physical Exam


Constitutional: no apparent distress, appears nourished, uncomfortable


Eyes: PERRL


Ears, Nose, Mouth, Throat: hearing normal


Respiratory: no respiratory distress


Skin: warm, other (left calf area w less swelling, palaple DP pulse)


Neurologic: AAOx3


Psychiatric: interacting appropriately





ICD10 Worksheet


Patient Problems: 


 Problems











Problem Status Onset


 


DVT (deep venous thrombosis) Acute
